# Patient Record
Sex: FEMALE | URBAN - METROPOLITAN AREA
[De-identification: names, ages, dates, MRNs, and addresses within clinical notes are randomized per-mention and may not be internally consistent; named-entity substitution may affect disease eponyms.]

---

## 2021-08-09 ENCOUNTER — WEB ENCOUNTER (OUTPATIENT)
Dept: URBAN - METROPOLITAN AREA CLINIC 98 | Facility: CLINIC | Age: 66
End: 2021-08-09

## 2021-08-09 ENCOUNTER — LAB OUTSIDE AN ENCOUNTER (OUTPATIENT)
Dept: URBAN - METROPOLITAN AREA CLINIC 98 | Facility: CLINIC | Age: 66
End: 2021-08-09

## 2021-08-09 ENCOUNTER — DASHBOARD ENCOUNTERS (OUTPATIENT)
Age: 66
End: 2021-08-09

## 2021-08-09 ENCOUNTER — OFFICE VISIT (OUTPATIENT)
Dept: URBAN - METROPOLITAN AREA CLINIC 98 | Facility: CLINIC | Age: 66
End: 2021-08-09
Payer: MEDICARE

## 2021-08-09 VITALS
BODY MASS INDEX: 21.09 KG/M2 | SYSTOLIC BLOOD PRESSURE: 115 MMHG | TEMPERATURE: 97.3 F | HEART RATE: 84 BPM | DIASTOLIC BLOOD PRESSURE: 78 MMHG | HEIGHT: 69 IN | WEIGHT: 142.4 LBS

## 2021-08-09 DIAGNOSIS — Z12.11 COLON CANCER SCREENING: ICD-10-CM

## 2021-08-09 DIAGNOSIS — R93.3 ABNORMAL CT SCAN, SMALL BOWEL: ICD-10-CM

## 2021-08-09 DIAGNOSIS — Z90.49 HISTORY OF TOTAL COLECTOMY: ICD-10-CM

## 2021-08-09 DIAGNOSIS — Z87.19 HISTORY OF ULCERATIVE COLITIS: ICD-10-CM

## 2021-08-09 DIAGNOSIS — Z79.2 CHRONIC ANTIBIOTIC SUPPRESSION: ICD-10-CM

## 2021-08-09 PROBLEM — 129679001: Status: ACTIVE | Noted: 2021-08-09

## 2021-08-09 PROBLEM — 119771000119101: Status: ACTIVE | Noted: 2021-08-09

## 2021-08-09 PROCEDURE — G8482 FLU IMMUNIZE ORDER/ADMIN: HCPCS | Performed by: INTERNAL MEDICINE

## 2021-08-09 PROCEDURE — 99204 OFFICE O/P NEW MOD 45 MIN: CPT | Performed by: INTERNAL MEDICINE

## 2021-08-09 PROCEDURE — 3017F COLORECTAL CA SCREEN DOC REV: CPT | Performed by: INTERNAL MEDICINE

## 2021-08-09 PROCEDURE — G8420 CALC BMI NORM PARAMETERS: HCPCS | Performed by: INTERNAL MEDICINE

## 2021-08-09 PROCEDURE — G8427 DOCREV CUR MEDS BY ELIG CLIN: HCPCS | Performed by: INTERNAL MEDICINE

## 2021-08-09 PROCEDURE — G9903 PT SCRN TBCO ID AS NON USER: HCPCS | Performed by: INTERNAL MEDICINE

## 2021-08-09 NOTE — HPI-TODAY'S VISIT:
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 She has had multiple obstructions over the past.   Most recently she was in [facility] with an obstruction Went to [facility] for evaluation.  CT with air-fluid levels and mild diffuse wall thickening of and mucosal hyperenhancement, engorgement of the vasa recta.  Regional mesenteric edema and trace ascites.   She opted for conservative measures as she did not want the recommended surgery (did not like that recommendation).  Also reports she cannot tolerate NGTs.   She spent a week on bowel rest with symptom resolution.  Of note, fecal stephanie checked with value of 2,636 ([id number]) Since dicharge, doing well Eating normal diet, but reports some early satiety No blood or mucus in stool Having 5-10 BM/day, mostly semi-formed stools Lost 12lbs in the hospital Curious if she may have Crohn's disease

## 2021-08-13 ENCOUNTER — LAB OUTSIDE AN ENCOUNTER (OUTPATIENT)
Dept: URBAN - METROPOLITAN AREA CLINIC 92 | Facility: CLINIC | Age: 66
End: 2021-08-13

## 2021-08-13 ENCOUNTER — TELEPHONE ENCOUNTER (OUTPATIENT)
Dept: URBAN - METROPOLITAN AREA CLINIC 92 | Facility: CLINIC | Age: 66
End: 2021-08-13

## 2021-08-13 LAB
A/G RATIO: 2.4
ACCA: 9
ALBUMIN: 4.6
ALCA: 5
ALKALINE PHOSPHATASE: 82
ALT (SGPT): 14
AMCA: 39
AST (SGOT): 20
ATYPICAL PANCA: POSITIVE
BASO (ABSOLUTE): 0
BASOS: 0
BILIRUBIN, TOTAL: 0.9
BUN/CREATININE RATIO: 20
BUN: 13
C-REACTIVE PROTEIN, QUANT: 2
CALCIUM: 9.1
CARBON DIOXIDE, TOTAL: 22
CHLORIDE: 101
COMMENTS: (no result)
CREATININE: 0.66
EGFR IF AFRICN AM: 106
EGFR IF NONAFRICN AM: 92
EOS (ABSOLUTE): 0.1
EOS: 1
FERRITIN, SERUM: 95
GASCA: 16
GLOBULIN, TOTAL: 1.9
GLUCOSE: 105
HBSAG SCREEN: NEGATIVE
HEMATOCRIT: 37.6
HEMATOLOGY COMMENTS:: (no result)
HEMOGLOBIN: 12.5
HEP B CORE AB, TOT: NEGATIVE
HEP B SURFACE AB, QUAL: NON REACTIVE
IMMATURE CELLS: (no result)
IMMATURE GRANS (ABS): 0
IMMATURE GRANULOCYTES: 0
LYMPHS (ABSOLUTE): 1.1
LYMPHS: 14
MCH: 31.5
MCHC: 33.2
MCV: 95
MONOCYTES(ABSOLUTE): 0.7
MONOCYTES: 9
NEUTROPHILS (ABSOLUTE): 6
NEUTROPHILS: 76
NRBC: (no result)
PLATELETS: 346
POTASSIUM: 4.2
PROTEIN, TOTAL: 6.5
QUANTIFERON CRITERIA: (no result)
QUANTIFERON INCUBATION: (no result)
QUANTIFERON MITOGEN VALUE: 0.69
QUANTIFERON NIL VALUE: 0.75
QUANTIFERON TB1 AG VALUE: 0.09
QUANTIFERON TB2 AG VALUE: 0.03
QUANTIFERON-TB GOLD PLUS: (no result)
RBC: 3.97
RDW: 12.8
SODIUM: 139
VITAMIN B12: 1703
VITAMIN D, 25-HYDROXY: 27.1
WBC: 7.9

## 2021-08-16 ENCOUNTER — TELEPHONE ENCOUNTER (OUTPATIENT)
Dept: URBAN - METROPOLITAN AREA CLINIC 23 | Facility: CLINIC | Age: 66
End: 2021-08-16

## 2021-08-16 ENCOUNTER — TELEPHONE ENCOUNTER (OUTPATIENT)
Dept: URBAN - METROPOLITAN AREA CLINIC 98 | Facility: CLINIC | Age: 66
End: 2021-08-16

## 2021-08-20 ENCOUNTER — WEB ENCOUNTER (OUTPATIENT)
Dept: URBAN - METROPOLITAN AREA CLINIC 98 | Facility: CLINIC | Age: 66
End: 2021-08-20

## 2021-08-20 PROBLEM — 275549008: Status: ACTIVE | Noted: 2021-08-09

## 2021-08-26 ENCOUNTER — OFFICE VISIT (OUTPATIENT)
Dept: URBAN - METROPOLITAN AREA SURGERY CENTER 18 | Facility: SURGERY CENTER | Age: 66
End: 2021-08-26
Payer: MEDICARE

## 2021-08-26 DIAGNOSIS — K51.00 ACUTE ULCERATIVE PANCOLITIS: ICD-10-CM

## 2021-08-26 PROCEDURE — G8907 PT DOC NO EVENTS ON DISCHARG: HCPCS | Performed by: INTERNAL MEDICINE

## 2021-08-26 PROCEDURE — 44385 ENDOSCOPY OF BOWEL POUCH: CPT | Performed by: INTERNAL MEDICINE

## 2021-09-10 ENCOUNTER — OFFICE VISIT (OUTPATIENT)
Dept: URBAN - METROPOLITAN AREA CLINIC 98 | Facility: CLINIC | Age: 66
End: 2021-09-10

## 2021-10-06 ENCOUNTER — TELEPHONE ENCOUNTER (OUTPATIENT)
Dept: URBAN - METROPOLITAN AREA CLINIC 98 | Facility: CLINIC | Age: 66
End: 2021-10-06

## 2022-04-13 ENCOUNTER — TELEPHONE ENCOUNTER (OUTPATIENT)
Dept: URBAN - METROPOLITAN AREA CLINIC 98 | Facility: CLINIC | Age: 67
End: 2022-04-13